# Patient Record
Sex: FEMALE | Race: WHITE | URBAN - METROPOLITAN AREA
[De-identification: names, ages, dates, MRNs, and addresses within clinical notes are randomized per-mention and may not be internally consistent; named-entity substitution may affect disease eponyms.]

---

## 2021-11-05 ENCOUNTER — HISTORICAL (OUTPATIENT)
Dept: RADIOLOGY | Facility: HOSPITAL | Age: 71
End: 2021-11-05

## 2021-12-14 ENCOUNTER — HOSPITAL ENCOUNTER (OUTPATIENT)
Dept: MEDSURG UNIT | Facility: HOSPITAL | Age: 71
End: 2021-12-16
Attending: INTERNAL MEDICINE | Admitting: INTERNAL MEDICINE

## 2021-12-14 LAB
ABS NEUT (OLG): 11.61 X10(3)/MCL (ref 2.1–9.2)
ALBUMIN SERPL-MCNC: 3.6 GM/DL (ref 3.4–4.8)
ALBUMIN/GLOB SERPL: 0.8 RATIO (ref 1.1–2)
ALP SERPL-CCNC: 155 UNIT/L (ref 40–150)
ALT SERPL-CCNC: 128 UNIT/L (ref 0–55)
APPEARANCE, UA: CLEAR
AST SERPL-CCNC: 101 UNIT/L (ref 5–34)
BACTERIA SPEC CULT: ABNORMAL /HPF
BASOPHILS # BLD AUTO: 0.1 X10(3)/MCL (ref 0–0.2)
BASOPHILS NFR BLD AUTO: 0 %
BILIRUB SERPL-MCNC: 2.1 MG/DL
BILIRUB UR QL STRIP: NEGATIVE
BILIRUBIN DIRECT+TOT PNL SERPL-MCNC: 1 MG/DL (ref 0–0.5)
BILIRUBIN DIRECT+TOT PNL SERPL-MCNC: 1.1 MG/DL (ref 0–0.8)
BUN SERPL-MCNC: 9 MG/DL (ref 9.8–20.1)
C DIFF INTERP: POSITIVE
CALCIUM SERPL-MCNC: 9.6 MG/DL (ref 8.7–10.5)
CHLORIDE SERPL-SCNC: 97 MMOL/L (ref 98–107)
CO2 SERPL-SCNC: 25 MMOL/L (ref 23–31)
COLOR UR: YELLOW
CREAT SERPL-MCNC: 0.78 MG/DL (ref 0.55–1.02)
EOSINOPHIL # BLD AUTO: 0.2 X10(3)/MCL (ref 0–0.9)
EOSINOPHIL NFR BLD AUTO: 1 %
ERYTHROCYTE [DISTWIDTH] IN BLOOD BY AUTOMATED COUNT: 13.2 % (ref 11.5–17)
GLOBULIN SER-MCNC: 4.4 GM/DL (ref 2.4–3.5)
GLUCOSE (UA): NEGATIVE
GLUCOSE SERPL-MCNC: 102 MG/DL (ref 82–115)
HCT VFR BLD AUTO: 43.7 % (ref 37–47)
HGB BLD-MCNC: 14.5 GM/DL (ref 12–16)
HGB UR QL STRIP: NEGATIVE
KETONES UR QL STRIP: ABNORMAL
LEUKOCYTE ESTERASE UR QL STRIP: NEGATIVE
LYMPHOCYTES # BLD AUTO: 1.5 X10(3)/MCL (ref 0.6–4.6)
LYMPHOCYTES NFR BLD AUTO: 10 %
MCH RBC QN AUTO: 28.1 PG (ref 27–31)
MCHC RBC AUTO-ENTMCNC: 33.2 GM/DL (ref 33–36)
MCV RBC AUTO: 84.7 FL (ref 80–94)
MONOCYTES # BLD AUTO: 1 X10(3)/MCL (ref 0.1–1.3)
MONOCYTES NFR BLD AUTO: 7 %
NEUTROPHILS # BLD AUTO: 11.61 X10(3)/MCL (ref 2.1–9.2)
NEUTROPHILS NFR BLD AUTO: 80 %
NITRITE UR QL STRIP: NEGATIVE
PH UR STRIP: 6 [PH] (ref 5–9)
PLATELET # BLD AUTO: 413 X10(3)/MCL (ref 130–400)
PMV BLD AUTO: 10.5 FL (ref 9.4–12.4)
POTASSIUM SERPL-SCNC: 3.4 MMOL/L (ref 3.5–5.1)
PROT SERPL-MCNC: 8 GM/DL (ref 5.8–7.6)
PROT UR QL STRIP: NEGATIVE
RBC # BLD AUTO: 5.16 X10(6)/MCL (ref 4.2–5.4)
RBC #/AREA URNS HPF: ABNORMAL /[HPF]
SARS-COV-2 AG RESP QL IA.RAPID: NEGATIVE
SODIUM SERPL-SCNC: 137 MMOL/L (ref 136–145)
SP GR UR STRIP: 1 (ref 1–1.03)
SQUAMOUS EPITHELIAL, UA: ABNORMAL /HPF (ref 0–4)
UROBILINOGEN UR STRIP-ACNC: 0.2
WBC # SPEC AUTO: 14.4 X10(3)/MCL (ref 4.5–11.5)
WBC #/AREA URNS AUTO: ABNORMAL /HPF (ref 0–3)
WBC #/AREA URNS HPF: ABNORMAL /[HPF]

## 2021-12-15 LAB
ABS NEUT (OLG): 8.33 X10(3)/MCL (ref 2.1–9.2)
ALBUMIN SERPL-MCNC: 2.7 GM/DL (ref 3.4–4.8)
ALBUMIN/GLOB SERPL: 0.6 RATIO (ref 1.1–2)
ALP SERPL-CCNC: 119 UNIT/L (ref 40–150)
ALT SERPL-CCNC: 77 UNIT/L (ref 0–55)
AST SERPL-CCNC: 62 UNIT/L (ref 5–34)
BASOPHILS # BLD AUTO: 0 X10(3)/MCL (ref 0–0.2)
BASOPHILS NFR BLD AUTO: 0 %
BILIRUB SERPL-MCNC: 0.9 MG/DL
BILIRUBIN DIRECT+TOT PNL SERPL-MCNC: 0.2 MG/DL (ref 0–0.5)
BILIRUBIN DIRECT+TOT PNL SERPL-MCNC: 0.7 MG/DL (ref 0–0.8)
BUN SERPL-MCNC: 5.9 MG/DL (ref 9.8–20.1)
CALCIUM SERPL-MCNC: 8 MG/DL (ref 8.7–10.5)
CHLORIDE SERPL-SCNC: 105 MMOL/L (ref 98–107)
CO2 SERPL-SCNC: 24 MMOL/L (ref 23–31)
CREAT SERPL-MCNC: 0.71 MG/DL (ref 0.55–1.02)
EOSINOPHIL # BLD AUTO: 0.3 X10(3)/MCL (ref 0–0.9)
EOSINOPHIL NFR BLD AUTO: 3 %
ERYTHROCYTE [DISTWIDTH] IN BLOOD BY AUTOMATED COUNT: 13.2 % (ref 11.5–17)
GLOBULIN SER-MCNC: 4.3 GM/DL (ref 2.4–3.5)
GLUCOSE SERPL-MCNC: 106 MG/DL (ref 82–115)
HCT VFR BLD AUTO: 36.4 % (ref 37–47)
HGB BLD-MCNC: 12.1 GM/DL (ref 12–16)
LYMPHOCYTES # BLD AUTO: 1.2 X10(3)/MCL (ref 0.6–4.6)
LYMPHOCYTES NFR BLD AUTO: 11 %
MCH RBC QN AUTO: 27.8 PG (ref 27–31)
MCHC RBC AUTO-ENTMCNC: 33.2 GM/DL (ref 33–36)
MCV RBC AUTO: 83.7 FL (ref 80–94)
MONOCYTES # BLD AUTO: 1 X10(3)/MCL (ref 0.1–1.3)
MONOCYTES NFR BLD AUTO: 9 %
NEUTROPHILS # BLD AUTO: 8.33 X10(3)/MCL (ref 2.1–9.2)
NEUTROPHILS NFR BLD AUTO: 76 %
PLATELET # BLD AUTO: 373 X10(3)/MCL (ref 130–400)
PMV BLD AUTO: 10.3 FL (ref 9.4–12.4)
POTASSIUM SERPL-SCNC: 4.5 MMOL/L (ref 3.5–5.1)
PROT SERPL-MCNC: 7 GM/DL (ref 5.8–7.6)
RBC # BLD AUTO: 4.35 X10(6)/MCL (ref 4.2–5.4)
SODIUM SERPL-SCNC: 138 MMOL/L (ref 136–145)
WBC # SPEC AUTO: 10.9 X10(3)/MCL (ref 4.5–11.5)

## 2021-12-16 LAB
ABS NEUT (OLG): 5.51 X10(3)/MCL (ref 2.1–9.2)
ALBUMIN SERPL-MCNC: 2.9 GM/DL (ref 3.4–4.8)
ALBUMIN/GLOB SERPL: 0.8 RATIO (ref 1.1–2)
ALP SERPL-CCNC: 133 UNIT/L (ref 40–150)
ALT SERPL-CCNC: 60 UNIT/L (ref 0–55)
AST SERPL-CCNC: 26 UNIT/L (ref 5–34)
BASOPHILS # BLD AUTO: 0 X10(3)/MCL (ref 0–0.2)
BASOPHILS NFR BLD AUTO: 0 %
BILIRUB SERPL-MCNC: 0.8 MG/DL
BILIRUBIN DIRECT+TOT PNL SERPL-MCNC: 0.4 MG/DL (ref 0–0.5)
BILIRUBIN DIRECT+TOT PNL SERPL-MCNC: 0.4 MG/DL (ref 0–0.8)
BUN SERPL-MCNC: 4.5 MG/DL (ref 9.8–20.1)
CALCIUM SERPL-MCNC: 9 MG/DL (ref 8.7–10.5)
CHLORIDE SERPL-SCNC: 106 MMOL/L (ref 98–107)
CO2 SERPL-SCNC: 27 MMOL/L (ref 23–31)
CREAT SERPL-MCNC: 0.68 MG/DL (ref 0.55–1.02)
EOSINOPHIL # BLD AUTO: 0.5 X10(3)/MCL (ref 0–0.9)
EOSINOPHIL NFR BLD AUTO: 6 %
ERYTHROCYTE [DISTWIDTH] IN BLOOD BY AUTOMATED COUNT: 13.6 % (ref 11.5–17)
FINAL CULTURE: NORMAL
GLOBULIN SER-MCNC: 3.6 GM/DL (ref 2.4–3.5)
GLUCOSE SERPL-MCNC: 92 MG/DL (ref 82–115)
HCT VFR BLD AUTO: 38.2 % (ref 37–47)
HGB BLD-MCNC: 12.3 GM/DL (ref 12–16)
LYMPHOCYTES # BLD AUTO: 2.6 X10(3)/MCL (ref 0.6–4.6)
LYMPHOCYTES NFR BLD AUTO: 27 %
MCH RBC QN AUTO: 27.8 PG (ref 27–31)
MCHC RBC AUTO-ENTMCNC: 32.2 GM/DL (ref 33–36)
MCV RBC AUTO: 86.2 FL (ref 80–94)
MONOCYTES # BLD AUTO: 0.9 X10(3)/MCL (ref 0.1–1.3)
MONOCYTES NFR BLD AUTO: 9 %
NEUTROPHILS # BLD AUTO: 5.51 X10(3)/MCL (ref 2.1–9.2)
NEUTROPHILS NFR BLD AUTO: 58 %
PLATELET # BLD AUTO: 438 X10(3)/MCL (ref 130–400)
PMV BLD AUTO: 10.4 FL (ref 9.4–12.4)
POTASSIUM SERPL-SCNC: 3.5 MMOL/L (ref 3.5–5.1)
PROT SERPL-MCNC: 6.5 GM/DL (ref 5.8–7.6)
RBC # BLD AUTO: 4.43 X10(6)/MCL (ref 4.2–5.4)
SODIUM SERPL-SCNC: 144 MMOL/L (ref 136–145)
WBC # SPEC AUTO: 9.6 X10(3)/MCL (ref 4.5–11.5)

## 2022-04-29 NOTE — ED PROVIDER NOTES
Patient:   Gabbi Wynn            MRN: [id number]            FIN: [id number]               Age:   71 years     Sex:  Female     :  1950   Associated Diagnoses:   Weakness or fatigue; Transaminitis; Colitis due to Clostridium difficile; Pancolitis   Author:   Carrie Law MD      Basic Information   Time seen: Date & time 2021 08:42:00.   History source: Patient.   Arrival mode: Private vehicle.   History limitation: None.   Additional information: Patient's physician(s): Dameon Mitchell MD.      History of Present Illness   The patient presents with 72 y/o female presents to ED c/o worsening generalized weakness. Pt states that she has had diarrhea for the last 2.5 weeks and notes it started while she was taking a course of antibiotics. Pt also endorses LLQ pain and nausea. She states that she came to ED today because seh felt dehydrated. She contacted her PCP, Dr. Mitchell, for a follow-up appointment but he is out of town until the . Pt denies fever. .  The onset was 2.5  weeks ago.  The course/duration of symptoms is worsening.  The character of symptoms is generalized.  The degree at present is moderate.  Risk factors consist of recent antibiotic use.  Prior episodes: none.  Therapy today: none.  Associated symptoms: abdominal pain, nausea, diarrhea and denies fever.  Additional history: none.        Review of Systems   Constitutional symptoms:  No fever, no chills   Skin symptoms:  No lacerations or contusionsNo abrasions   Eye symptoms:  Vision unchangedNo pain, no blurred vision.    ENMT symptoms:  No epistaxis, No ear pain,    Respiratory symptoms:  No shortness of breath   Cardiovascular symptoms:  No chest pain, no syncope.    Gastrointestinal symptoms:  Abdominal pain, nausea, diarrheaNo vomiting,    Musculoskeletal symptoms:  No back pain, no Muscle pain.    Neurologic symptoms:  No headache, no dizziness, no numbness, no tingling, no weakness.              Additional  review of systems information: All other systems reviewed and otherwise negative.      Health Status   Allergies:    No active allergies have been recorded..   Medications:  (Selected)   Inpatient Medications  Ordered  Bentyl: 20 mg, form: Injection, IM, Once, first dose 12/14/21 8:38:00 CST, stop date 12/14/21 8:38:00 CST, STAT  NS (0.9% Sodium Chloride) Infusion: 1,000 mL, 1,000 mL, IV, Once, 1000 mL/hr, start date 12/14/21 7:57:00 CST, stop date 12/14/21 7:57:00 CST, STAT.      Past Medical/ Family/ Social History   Medical history: Reviewed as documented in chart.   Surgical history: Negative.   Family history: Not significant.   Social history:    Social & Psychosocial Habits    Tobacco  12/14/2021  Use: Never (less than 100 in l    Patient Wants Consult For Cessation Counseling N/A    12/15/2021  Use: Never (less than 100 in l    Patient Wants Consult For Cessation Counseling No    Abuse/Neglect  12/14/2021  SHX Any signs of abuse or neglect No    12/15/2021  SHX Any signs of abuse or neglect No  , Reviewed as documented in chart, Tobacco use: Denies, Family/social situation: .      Physical Examination               Vital Signs   Vital Signs   12/14/2021 7:51 CST      Temperature Oral          36.8 DegC                             Temperature Oral (calculated)             98.24 DegF                             Peripheral Pulse Rate     127 bpm  HI                             Respiratory Rate          18 br/min                             SpO2                      96 %                             Oxygen Therapy            Room air                             Systolic Blood Pressure   160 mmHg  HI                             Diastolic Blood Pressure  94 mmHg  HI  .   Measurements   12/14/2021 7:51 CST      Weight Dosing             70 kg                             Weight Measured and Calculated in Lbs     154.32 lb                             Weight Estimated          70 kg                              Height/Length Dosing      154 cm                             Height/Length Estimated   154 cm                             Body Mass Index Estimated 29.52 kg/m2  .   Basic Oxygen Information   12/14/2021 7:51 CST      SpO2                      96 %                             Oxygen Therapy            Room air  .   General:  Alert, no acute distress.    Neurological:  Alert and oriented to person, place, time, and situation   Skin:  Warm, dry   Head:  Normocephalic, atraumatic.    Neck:  Supple, trachea midline   Eye:  Normal conjunctiva   Ears, nose, mouth and throat:  dry oral mucosa.   Cardiovascular:  No murmur, Normal peripheral perfusion, Tachycardia.    Respiratory:  Lungs are clear to auscultation, respirations are non-labored.    Gastrointestinal:  Soft, Non distended, Tenderness: Left lower quadrant, Guarding: Negative, Rebound: Negative.    Psychiatric:  Cooperative      Medical Decision Making   Rationale:  Ms. Wynn pesented w/ abdominal pain, persistent diarrhea w/ recent abx use. C. Diff + with pancolitis on CT scan. National shortage of IV flagyl at this time. Started on PO vancomycin. Approached by pharmacist who recommended concomitant flagyl - so ordered. Will admit for IV fluid resusciation, trending of hepatic indices. Findings and plan discussed with the patient, and she is agreeable to admission at this time.   .   Documents reviewed:  Emergency department nurses' notes.   Orders  Launch Order Profile (Selected)   Inpatient Orders  Ordered  Patient Isolation: 12/14/21 8:38:36 CST, Contact Precautions (C. diff), Constant Indicator  Possible SIRS: 12/14/21 9:54:28 CST, Once  Possible Sepsis: 12/14/21 9:59:30 CST, Once  Ordered (In-Lab)  Stool Culture: Stat collect, 12/14/21 9:17:00 CST, Stool, Nurse collect, Stop date 12/14/21 9:17:00 CST  Completed  Automated Diff: Stat collect, 12/14/21 8:30:00 CST, Blood, Collected, Stop date 12/14/21 8:30:00 CST, Lab Collect, Print Label By Order Location,  12/14/21 7:56:00 CST  Bentyl: 20 mg, form: Injection, IM, Once, first dose 12/14/21 8:38:00 CST, stop date 12/14/21 8:38:00 CST, STAT  C Diff Toxin - stool: Stat collect, 12/14/21 8:38:00 CST, Stool Clostrium difficile, Stop date 12/14/21 8:38:00 CST, Nurse collect, Print Label By Order Location, 12/14/21 8:38:00 CST  CBC: Stat collect, 12/14/21 7:56:00 CST, Blood, Stop date 12/14/21 7:58:00 CST, Lab Collect, Print Label By Order Location, 12/14/21 7:56:00 CST  CMP: Stat collect, 12/14/21 7:56:00 CST, Blood, Stop date 12/14/21 7:58:00 CST, Lab Collect, Print Label By Order Location, 12/14/21 7:56:00 CST  CT Abdomen and Pelvis W Contrast: Stat, 12/14/21 9:17:00 CST, Abdominal Pain, LLQ abd pain, diarrhea, None, Stretcher, Creatinine if needed per protocol, Rad Type, 12/14/21 9:17:00 CST  Estimated Glomerular Filtration Rate: Stat collect, 12/14/21 8:30:00 CST, Blood, Collected, Stop date 12/14/21 8:30:00 CST, Lab Collect, Print Label By Order Location, 12/14/21 7:56:00 CST  NS (0.9% Sodium Chloride) Infusion: 1,000 mL, 1,000 mL, IV, Once, 1000 mL/hr, start date 12/14/21 7:57:00 CST, stop date 12/14/21 7:57:00 CST, STAT  UA a reflex to culture: Stat collect, Urine, 12/14/21 7:58:00 CST, Stop date 12/14/21 7:58:00 CST, Nurse collect, Print Label By Order Location  iopamidol: form: Soln, IV, AdHoc, first dose 12/14/21 10:18:57 CST, stop date 12/14/21 10:18:57 CST.   Results review:  Lab results : Lab View   12/14/2021 8:20 CST      C Diff Comment                12/14/2021 9:56 CST      Est Creat Clearance Ser   50.45 mL/min    12/14/2021 8:30 CST      Sodium Lvl                137 mmol/L                             Potassium Lvl             3.4 mmol/L  LOW                             Chloride                  97 mmol/L  LOW                             CO2                       25 mmol/L                             Calcium Lvl               9.6 mg/dL                             Glucose Lvl               102 mg/dL                              BUN                       9.0 mg/dL  LOW                             Creatinine                0.78 mg/dL                             eGFR-AA                   >60  NA                             eGFR-PING                  >60 mL/min/1.73 m2  NA                             Bili Total                2.1 mg/dL  HI                             Bili Direct               1.0 mg/dL  HI                             Bili Indirect             1.10 mg/dL  HI                             AST                       101 unit/L  HI                             ALT                       128 unit/L  HI                             Alk Phos                  155 unit/L  HI                             Total Protein             8.0 gm/dL  HI                             Albumin Lvl               3.6 gm/dL                             Globulin                  4.4 gm/dL  HI                             A/G Ratio                 0.8 ratio  LOW                             WBC                       14.4 x10(3)/mcL  HI                             RBC                       5.16 x10(6)/mcL                             Hgb                       14.5 gm/dL                             Hct                       43.7 %                             Platelet                  413 x10(3)/mcL  HI                             MCV                       84.7 fL                             MCH                       28.1 pg                             MCHC                      33.2 gm/dL                             RDW                       13.2 %                             MPV                       10.5 fL                             Abs Neut                  11.61 x10(3)/mcL  HI                             Neutro Auto               80 %  NA                             Lymph Auto                10 %  NA                             Mono Auto                 7 %  NA                             Eos Auto                  1 %  NA                              Abs Eos                   0.2 x10(3)/mcL                             Basophil Auto             0 %  NA                             Abs Neutro                11.61 x10(3)/mcL  HI                             Abs Lymph                 1.5 x10(3)/mcL                             Abs Mono                  1.0 x10(3)/mcL                             Abs Baso                  0.1 x10(3)/mcL    12/14/2021 8:20 CST      C Diff Interp             Positive    12/14/2021 7:57 CST      UA Appear                 CLEAR                             UA Color                  YELLOW                             UA Spec Grav              1.004                             UA Bili                   Negative                             UA pH                     6.0                             UA Urobilinogen           0.2                             UA Blood                  Negative                             UA Glucose                Negative                             UA Ketones                1+                             UA Protein                Negative                             UA Nitrite                Negative                             UA Leuk Est               Negative                             UA WBC                    NONE SEEN                             UA WBC cnt                NONE SEEN /HPF                             UA RBC                    NONE SEEN                             UA Bacteria               NONE SEEN /HPF                             UA Squam Epithelial       NONE SEEN /HPF  , Interpretation Abnormal results  leukocytosis, elevated LFTs, C diff+.    Radiology results:  Reviewed radiologist's report, Rad Results (ST)  < 12 hrs   Accession: [id number]  Order: CT Abdomen and Pelvis W Contrast  Report Dt/Tm: 12/14/2021 10:33  Report:      History.  Abdominal pain.     Reference.  No priors     Technique.  Helical acquisition through the abdomen and pelvis with IV contrast.  Three plane  reconstructions were provided for review.  mGycm.  Automatic exposure control, adjustment of mA/kV or iterative  reconstruction technique was used to reduce radiation.     Findings.  Tiny right lower lobe nodule image 19 series 4 is of low suspicion.     Suspect hepatic steatosis. There is a 26 mm cyst of the right hepatic  lobe. Patent portal vein. Distended gallbladder. No biliary ductal  dilatation. There is no significant abnormality the spleen, pancreas  or adrenals. There is no hydronephrosis.      There is wall thickening involving essentially the entire colon. There  is colonic diverticulosis. Appendix prominent but no significant  periappendiceal inflammation. Scattered mesenteric lymph nodes may be  reactive. No bowel obstruction.     Urinary bladder is unremarkable. No free fluid. Abdominal aorta normal  in caliber. Dense atherosclerotic disease. No enlarged retroperitoneal  lymph nodes.      No acute osseous findings.     Impression.  Pancolitis. No gross perforation or drainable abscess. Other chronic  findings above.               .       Impression and Plan   Diagnosis   Weakness or fatigue (PNED [id number])   Transaminitis (PAZ40-TA R74.01)   Colitis due to Clostridium difficile (HBF57-DP A04.72)   Pancolitis (XGY53-EQ K51.00)   Plan   Condition: Stable.    Disposition: Admit time  12/14/2021 11:55:00, Paula LEWIS, [facility], case discussed with Dr. Farrell.    Counseled: Patient, Regarding diagnosis, Regarding diagnostic results, Regarding treatment plan, Patient indicated understanding of instructions.    Notes: IAna, acted solely as a scribe for and in the presence of Dr. Law who performed the service., I, Carrie Law, have independently performed the history, physical, medical decision making and procedures as documented above and agree with the scribe's documentation. .

## 2022-04-29 NOTE — H&P
This is a 71-year-old female, who is usually followed by Dr. Mitchell, has serious medical problems, has a history of tachycardia controlled with diltiazem, who began with diarrhea about 2 weeks ago.  She has had low-grade fever, diarrhea, worsening over the past 2 weeks; became febrile, generally weak and worse yesterday, so she came to the emergency room, was seen and examined and subsequently admitted.  She had some hepatic dysfunction as well as the diarrhea, was admitted for treatment and IV fluids and consultation with GI because of both of these.    PAST HISTORY:  Surgical:  The patient has had skin cancer removal from the back of her head.  This represents the only surgical procedure.       Medical:  The patient has a history of tachycardia as noted above and that is the only medication she takes regularly.    FAMILY HISTORY:  The patient's mother  at age 43 with congenital heart disease.  Father  at age 46 with CA of the throat.  No diseases run in the family otherwise.    SOCIAL HISTORY:  The patient is the  of a childcare business who neither smokes nor drinks, nor has she ever.    REVIEW OF SYSTEMS:  A 12-point system review:   HEENT:  No complaints.   PULMONARY:  No cough, no hemoptysis.   CARDIAC:  No chest pain or shortness of breath.   GI:  See HPI.   :  No dysuria.  No frequency.   ORTHOPEDIC:  No complaints.     CONSTITUTIONALLY:  She has low-grade fever and no chills.     DERMATOLOGICALLY:  No rash at the present time.     ENDOCRINOLOGY:  She is not diabetic.  She has had hepatitis A years ago.    PHYSICAL EXAMINATION:  VITALS:  Her temperature is 98.4, pulse 99, respirations 16, blood pressure 152/77, O2 sat is 95% on room air.   HEENT:  Head normal in size and shape without mass or sign of trauma.  Eyes:  Sclerae nonicteric, PERRLA, extraocular muscles are intact.  Ears:  Tympanic membranes within normal limits.  Nose:  No discharge.  Throat clear.   CHEST:  Clear.      HEART:  Regular rhythm without murmur, not enlarged.     ABDOMEN:  Soft with mild tenderness in the epigastrium and left upper quadrant.  No rebound, masses, or organomegaly noted.  Bowel sounds active.     EXTREMITIES:  No cyanosis.  No edema.  No clubbing.    IMPRESSION:    1. Weakness, fatigue, and dehydration.  2. Diarrhea secondary to Clostridium difficile.  3. Hepatic dysfunction.    PLAN:    1. Start the patient on IV fluids.  2. Treatment for the C difficile to include Flagyl and vancomycin.   3. Consult GI for evaluation.        ______________________________  MD ERNESTINA Mclean/DAWIT  DD:  12/15/2021  Time:  10:56AM  DT:  12/15/2021  Time:  11:19AM  Job #:  [id number]    The H&P was reviewed, the patient was examined, and the following changes to the patients condition are noted:  ______________________________________________________________________________  ______________________________________________________________________________  ______________________________________________________________________________  [  ] No changes to the patient's condition:      ______________________________                                             ___________________  PHYSICIAN SIGNATURE                                                             DATE/TIME

## 2022-04-29 NOTE — DISCHARGE SUMMARY
DISCHARGE DATE:  12/16/2021    HOSPITAL COURSE:  This is a 71-year-old female usually followed by Dr. Mitchell, admitted with Clostridium difficile colitis and diarrhea.  She also had some hepatic dysfunction noted on admission labs, was treated with oral vancomycin and oral Flagyl.  She is improving but not completely well.  She is still having 4-5 bowel movements a day.  They are kendall up, not solid yet, but she is improving, eating and drinking well.  Being discharged home on oral vancomycin as recommended by the Infectious Disease 25 mg t.i.d. and Zantac to replace her Protonix.  We will get her to see Dr. Mitchell in a week or 2 to follow up on hepatic dysfunction.    FINAL DIAGNOSES:    1. Weakness, fatigue, and dehydration.   2. Diarrhea secondary to Clostridium difficile colitis.   3. Hepatic dysfunction.   4. History of tachycardia.        ______________________________  MD ERNESTINA Mclean/ABHIJEET  DD:  12/16/2021  Time:  11:00AM  DT:  12/16/2021  Time:  11:48AM  Job #:  [id number]